# Patient Record
Sex: FEMALE | Race: WHITE | NOT HISPANIC OR LATINO | Employment: OTHER | URBAN - METROPOLITAN AREA
[De-identification: names, ages, dates, MRNs, and addresses within clinical notes are randomized per-mention and may not be internally consistent; named-entity substitution may affect disease eponyms.]

---

## 2024-04-22 ENCOUNTER — APPOINTMENT (OUTPATIENT)
Dept: RADIOLOGY | Facility: CLINIC | Age: 74
End: 2024-04-22
Payer: MEDICARE

## 2024-05-24 ENCOUNTER — HOSPITAL ENCOUNTER (OUTPATIENT)
Dept: RADIOLOGY | Facility: HOSPITAL | Age: 74
Discharge: HOME | End: 2024-05-24
Payer: MEDICARE

## 2024-05-24 DIAGNOSIS — I25.83 CORONARY ATHEROSCLEROSIS DUE TO LIPID RICH PLAQUE (CODE): ICD-10-CM

## 2024-05-24 DIAGNOSIS — I25.10 ATHEROSCLEROTIC HEART DISEASE OF NATIVE CORONARY ARTERY WITHOUT ANGINA PECTORIS: ICD-10-CM

## 2024-05-24 DIAGNOSIS — Z13.6 ENCOUNTER FOR SCREENING FOR CARDIOVASCULAR DISORDERS: ICD-10-CM

## 2024-05-24 PROCEDURE — 75571 CT HRT W/O DYE W/CA TEST: CPT

## 2025-01-05 ENCOUNTER — HOSPITAL ENCOUNTER (EMERGENCY)
Facility: HOSPITAL | Age: 75
Discharge: HOME | End: 2025-01-05
Payer: MEDICARE

## 2025-01-05 ENCOUNTER — APPOINTMENT (OUTPATIENT)
Dept: RADIOLOGY | Facility: HOSPITAL | Age: 75
End: 2025-01-05
Payer: MEDICARE

## 2025-01-05 VITALS
TEMPERATURE: 97.3 F | RESPIRATION RATE: 18 BRPM | WEIGHT: 106 LBS | SYSTOLIC BLOOD PRESSURE: 130 MMHG | OXYGEN SATURATION: 96 % | HEIGHT: 62 IN | HEART RATE: 60 BPM | DIASTOLIC BLOOD PRESSURE: 67 MMHG | BODY MASS INDEX: 19.51 KG/M2

## 2025-01-05 DIAGNOSIS — J44.1 COPD WITH ACUTE EXACERBATION (MULTI): Primary | ICD-10-CM

## 2025-01-05 LAB
FLUAV RNA RESP QL NAA+PROBE: NOT DETECTED
FLUBV RNA RESP QL NAA+PROBE: NOT DETECTED
SARS-COV-2 RNA RESP QL NAA+PROBE: NOT DETECTED

## 2025-01-05 PROCEDURE — 71046 X-RAY EXAM CHEST 2 VIEWS: CPT | Performed by: RADIOLOGY

## 2025-01-05 PROCEDURE — 71046 X-RAY EXAM CHEST 2 VIEWS: CPT

## 2025-01-05 PROCEDURE — 87636 SARSCOV2 & INF A&B AMP PRB: CPT | Performed by: PHYSICIAN ASSISTANT

## 2025-01-05 PROCEDURE — 2500000002 HC RX 250 W HCPCS SELF ADMINISTERED DRUGS (ALT 637 FOR MEDICARE OP, ALT 636 FOR OP/ED): Performed by: PHYSICIAN ASSISTANT

## 2025-01-05 PROCEDURE — 94640 AIRWAY INHALATION TREATMENT: CPT

## 2025-01-05 PROCEDURE — 99284 EMERGENCY DEPT VISIT MOD MDM: CPT | Mod: 25

## 2025-01-05 RX ORDER — IPRATROPIUM BROMIDE AND ALBUTEROL SULFATE 2.5; .5 MG/3ML; MG/3ML
3 SOLUTION RESPIRATORY (INHALATION) ONCE
Status: COMPLETED | OUTPATIENT
Start: 2025-01-05 | End: 2025-01-05

## 2025-01-05 RX ORDER — DOXYCYCLINE 100 MG/1
100 CAPSULE ORAL 2 TIMES DAILY
Qty: 14 CAPSULE | Refills: 0 | Status: SHIPPED | OUTPATIENT
Start: 2025-01-05 | End: 2025-01-12

## 2025-01-05 RX ADMIN — IPRATROPIUM BROMIDE AND ALBUTEROL SULFATE 3 ML: 2.5; .5 SOLUTION RESPIRATORY (INHALATION) at 10:50

## 2025-01-05 ASSESSMENT — LIFESTYLE VARIABLES
HAVE YOU EVER FELT YOU SHOULD CUT DOWN ON YOUR DRINKING: NO
EVER FELT BAD OR GUILTY ABOUT YOUR DRINKING: NO
EVER HAD A DRINK FIRST THING IN THE MORNING TO STEADY YOUR NERVES TO GET RID OF A HANGOVER: NO
HAVE PEOPLE ANNOYED YOU BY CRITICIZING YOUR DRINKING: NO
TOTAL SCORE: 0

## 2025-01-05 ASSESSMENT — COLUMBIA-SUICIDE SEVERITY RATING SCALE - C-SSRS
1. IN THE PAST MONTH, HAVE YOU WISHED YOU WERE DEAD OR WISHED YOU COULD GO TO SLEEP AND NOT WAKE UP?: NO
6. HAVE YOU EVER DONE ANYTHING, STARTED TO DO ANYTHING, OR PREPARED TO DO ANYTHING TO END YOUR LIFE?: NO
2. HAVE YOU ACTUALLY HAD ANY THOUGHTS OF KILLING YOURSELF?: NO

## 2025-01-05 ASSESSMENT — PAIN - FUNCTIONAL ASSESSMENT: PAIN_FUNCTIONAL_ASSESSMENT: 0-10

## 2025-01-05 ASSESSMENT — PAIN SCALES - GENERAL: PAINLEVEL_OUTOF10: 0 - NO PAIN

## 2025-01-05 NOTE — ED TRIAGE NOTES
Pt here with complaints of URI symptoms x 6 days. She started prescribed meds last Tuesday from her pcp and states she is not better.

## 2025-01-05 NOTE — ED PROVIDER NOTES
HPI   Chief Complaint   Patient presents with    Cough       History of present illness: 34-year-old female with history of COPD complains of cough for the past 6 days.  Cough is been productive of yellow sputum.  Patient has mild shortness of breath.  Has associated rhinorrhea and congestion.  Has been around other sick individuals recently.  Has some abdominal discomfort coughing.  No abdominal pain at baseline.  Denies chest pain back pain neck stiffness headache diplopia blurred vision paresthesias incontinence dysuria polyuria nausea vomiting.   He has an inhaler which has been helpful in bringing up her sputum.  She has not been using her nebulizer.  She does not take a normal daily medication for COPD.    Review of systems: Constitutional, eye, ENT, cardiovascular, respiratory, gastrointestinal, genitourinary, neurologic, musculoskeletal, dermatologic, hematologic, endocrine systems were evaluated and were negative unless otherwise specified in history of present illness.    Medications: Reviewed and per nursing note.    Family history: Denies relevant medical conditions.    Past medical history: COPD    Social history: Smokes cigarettes and denies alcohol, drug use.      Physical exam:    Appearance: Well-developed, well-nourished, nontoxic-appearing, alert and oriented x3. Talking in complete sentences.    HEENT:  Head normocephalic atraumatic, extraocular movements intact, pupils equal round reactive to light, mucous membranes are moist and pink.    NECK:  Nml Inspection, no meningismus, no thyromegaly, no lymphadenopathy, no JVD, trachea is midline.    Respiratory: Diminished breath sounds bilaterally.  No discrete adventitious sounds.  No respiratory distress.    Cardiovascular: Regular rate and rhythm, no murmurs, rubs or gallops. Pulses 2+ symmetrically in the dorsalis pedis and radial pulses.    Abdomen/GI:  Soft, nontender, nondistended, normal bowel sounds x4. No masses or organomegaly.    :  No  CVA tenderness    Neuro:  Oriented x 3, Speech Clear, cranial nerves grossly intact. Normal sensation to light touch in all 4 extremities.    Musculoskeletal: Patient spontaneously moves all 4 extremities.    Skin:  No cyanosis, clubbing, edema, open wounds, or rashes.  Diminished breath sounds bilaterally            Patient History   Past Medical History:   Diagnosis Date    COPD (chronic obstructive pulmonary disease) (Multi)     Emphysema lung (Multi)     Personal history of other diseases of the musculoskeletal system and connective tissue     Personal history of arthritis     History reviewed. No pertinent surgical history.  No family history on file.  Social History     Tobacco Use    Smoking status: Every Day     Types: Cigarettes    Smokeless tobacco: Never   Substance Use Topics    Alcohol use: Yes     Comment: ocassional    Drug use: Never       Physical Exam   ED Triage Vitals [01/05/25 1000]   Temperature Heart Rate Respirations BP   36.3 °C (97.3 °F) 60 18 130/67      Pulse Ox Temp Source Heart Rate Source Patient Position   96 % Temporal Monitor --      BP Location FiO2 (%)     -- --       Physical Exam      ED Course & MDM   Diagnoses as of 01/05/25 1218   COPD with acute exacerbation (Multi)                 No data recorded     [place] Coma Scale Score: 15 (01/05/25 0957 : Danya Landrum, RN)                           Medical Decision Making  Labs Reviewed  SARS-COV-2 AND INFLUENZA A/B PCR - Normal    XR chest 2 views   Final Result    No active disease in the chest identified.          MACRO:    None          Signed by: Dwight Andrade 1/5/2025 11:09 AM    Dictation workstation:   [id number]         Patient complains of cough and congestion.  Differential diagnosis of COVID-19, influenza, pneumonia, otitis media, tonsillitis, meningitis, sinusitis, COPD exacerbation, bronchitis.  Examination shows lungs clear to auscultation, normal tympanic membranes, no meningeal signs, no sinus tenderness making  pneumonia, otitis media, meningitis, sinusitis unlikely.      Chest x-ray influenza COVID-19 swabs ordered.    Swabs are negative.  Chest x-ray shows no infiltrates.  Patient had improvement of symptoms with nebulizer.  Presentation most consistent with COPD exacerbation.  Patient given prescription for doxycycline, to continue Medrol Dosepak, and to use inhaler and nebulizer as needed.    Patient will be discharged to home with prescription.  Patient is educated in signs and symptoms of worsening symptoms and reasons to come back to the emergency department.  Will need to follow up with primary care provider.  Patient does not report social determinants of health impacting ability to obtain care that is needed.  Patient agrees with plan.    This is a transcription.  Text was reviewed for errors, but some transcription errors may remain.  Please call for any questions.          Procedure  Procedures     Jersey Stevens PA-C  01/05/25 9317

## 2025-07-17 ENCOUNTER — HOSPITAL ENCOUNTER (EMERGENCY)
Facility: HOSPITAL | Age: 75
Discharge: HOME | End: 2025-07-17
Payer: MEDICARE

## 2025-07-17 ENCOUNTER — APPOINTMENT (OUTPATIENT)
Dept: RADIOLOGY | Facility: HOSPITAL | Age: 75
End: 2025-07-17
Payer: MEDICARE

## 2025-07-17 VITALS
WEIGHT: 100 LBS | DIASTOLIC BLOOD PRESSURE: 61 MMHG | RESPIRATION RATE: 20 BRPM | TEMPERATURE: 98.4 F | SYSTOLIC BLOOD PRESSURE: 122 MMHG | HEART RATE: 77 BPM | OXYGEN SATURATION: 99 % | HEIGHT: 61 IN | BODY MASS INDEX: 18.88 KG/M2

## 2025-07-17 DIAGNOSIS — M79.671 RIGHT FOOT PAIN: Primary | ICD-10-CM

## 2025-07-17 PROCEDURE — 73610 X-RAY EXAM OF ANKLE: CPT | Mod: RT

## 2025-07-17 PROCEDURE — 73630 X-RAY EXAM OF FOOT: CPT | Mod: RIGHT SIDE | Performed by: RADIOLOGY

## 2025-07-17 PROCEDURE — 99284 EMERGENCY DEPT VISIT MOD MDM: CPT

## 2025-07-17 PROCEDURE — 73610 X-RAY EXAM OF ANKLE: CPT | Mod: RIGHT SIDE | Performed by: STUDENT IN AN ORGANIZED HEALTH CARE EDUCATION/TRAINING PROGRAM

## 2025-07-17 PROCEDURE — 73630 X-RAY EXAM OF FOOT: CPT | Mod: RT

## 2025-07-17 RX ORDER — OXYCODONE AND ACETAMINOPHEN 5; 325 MG/1; MG/1
1 TABLET ORAL EVERY 6 HOURS PRN
Qty: 5 TABLET | Refills: 0 | Status: SHIPPED | OUTPATIENT
Start: 2025-07-17 | End: 2025-07-20

## 2025-07-17 RX ORDER — OXYCODONE AND ACETAMINOPHEN 5; 325 MG/1; MG/1
1 TABLET ORAL ONCE
Refills: 0 | Status: DISCONTINUED | OUTPATIENT
Start: 2025-07-17 | End: 2025-07-17 | Stop reason: HOSPADM

## 2025-07-17 ASSESSMENT — PAIN SCALES - GENERAL: PAINLEVEL_OUTOF10: 6

## 2025-07-17 ASSESSMENT — PAIN - FUNCTIONAL ASSESSMENT: PAIN_FUNCTIONAL_ASSESSMENT: 0-10

## 2025-07-17 NOTE — ED PROVIDER NOTES
EMERGENCY MEDICINE EVALUATION NOTE    History of Present Illness     Chief Complaint:   Chief Complaint   Patient presents with    right ankle pain       HPI: Ember Whitmore is a 74 y.o. female presents with a chief complaint of right ankle pain.  Patient initially told triage note she did not think she injured it however when I was talking to her she states that she believes she did it while she was on the porch yesterday.  She reports that she was getting up out of her chair and she felt like her foot stuck.  She awkwardly twisted the right lower extremity.  Patient reports that since then she has had swelling and pain along the lateral aspect of the right ankle and foot.  She has been able to put weight on it however it is painful for her.  Patient denies any loss of neurovascular status right lower extremity.      Previous History   Medical History[1]  Surgical History[2]  Social History[3]  Family History[4]  Allergies[5]  Current Outpatient Medications   Medication Instructions    oxyCODONE-acetaminophen (Percocet) 5-325 mg tablet 1 tablet, oral, Every 6 hours PRN       Physical Exam     Appearance: Alert, oriented , cooperative     Skin: Intact,  dry skin, no lesions, rash, petechiae or purpura.      Eyes: PERRLA, EOMs intact,  Conjunctiva pink      ENT: Hearing grossly intact. Pharynx clear, uvula midline.      Neck: Supple. Trachea at midline.      Pulmonary: Clear bilaterally. No rales, rhonchi or wheezing. No accessory muscle use or stridor.     Cardiac: Normal rate and rhythm without murmur     Abdomen: Soft, nontender, active bowel sounds.     Musculoskeletal: Full range of motion.  Patient is able to plantarflex dorsiflex right foot.  Patient does have diffuse tenderness along the lateral right foot as well as the lateral malleolus.  There is some swelling and bruising noted along the lateral right foot.     Neurological:Cranial nerves II through XII are grossly intact, normal sensation, no  "weakness, no focal findings identified.     Results   Labs Reviewed - No data to display  XR ankle right 3+ views   Final Result   Right ankle soft tissue swelling without fracture.   Signed by Ankit Hubbard      XR foot right 3+ views   Final Result   No definite acute osseous abnormality identified.  Postoperative   changes suggesting prior bunionectomy..   Suspected prior healed trauma base of the fifth metatarsal.   Signed by Robert Esposito            ED Course & Medical Decision Making     Medications   oxyCODONE-acetaminophen (Percocet) 5-325 mg per tablet 1 tablet (1 tablet oral Not Given 7/17/25 1510)     Heart Rate:  [77]   Temperature:  [36.9 °C (98.4 °F)]   Respirations:  [20]   BP: (122)/(61)   Height:  [154.9 cm (5' 1\")]   Weight:  [45.4 kg (100 lb)]   Pulse Ox:  [99 %]    ED Course as of 07/17/25 1748   Thu Jul 17, 2025 1745 Updated the patient on the results of the x-rays.  Did not appear to be any acute fracture but there could have been a remote fracture of the right fifth metatarsal, but there does not appear to be any acute fractures.  We discussed plan of care going forward.  Patient will be placed in a walking boot at this time.  She will have pain medication sent to her pharmacy.  She states that she has an orthopedic surgeon she can see as her  sees 1.  She declined referral to 1 at this time.  Patient encouraged to follow-up with them as an outpatient and take pain medication as prescribed.  She was encouraged to return here immediately with any worsening symptoms. [CJ]      ED Course User Index  [CJ] Juan Antonio Martínez PA-C         Diagnoses as of 07/17/25 1748   Right foot pain       Procedures   Splint Application    Performed by: Juan Antonio Martínez PA-C  Authorized by: Juan Antonio Martínez PA-C    Consent:     Consent obtained:  Verbal    Consent given by:  Patient    Risks, benefits, and alternatives were discussed: yes      Risks discussed:  Discoloration, numbness, pain and swelling    " Alternatives discussed:  No treatment and delayed treatment  Universal protocol:     Patient identity confirmed:  Verbally with patient  Pre-procedure details:     Distal neurologic exam:  Normal    Distal perfusion: distal pulses strong and brisk capillary refill    Procedure details:     Location:  Foot    Foot location:  R foot    Supplies:  Boot    Supervision: I personally supervised and inspected the splint/strap which was applied. The extremity is appropriately immobilized. Patient neurovascularly intact before and after the splint application.    Post-procedure details:     Distal neurologic exam:  Normal    Distal perfusion: brisk capillary refill      Procedure completion:  Tolerated well, no immediate complications    Post-procedure imaging: not applicable        Diagnosis     1. Right foot pain        Disposition   Discharged     ED Prescriptions       Medication Sig Dispense Start Date End Date Auth. Provider    oxyCODONE-acetaminophen (Percocet) 5-325 mg tablet Take 1 tablet by mouth every 6 hours if needed for severe pain (7 - 10) for up to 3 days. 5 tablet 7/17/2025 7/20/2025 Juan Antonio Martínez PA-C            Disclaimer: This note was dictated by speech recognition. Minor errors in transcription may be present. Please call if questions.         [1]   Past Medical History:  Diagnosis Date    COPD (chronic obstructive pulmonary disease) (Multi)     Emphysema lung (Multi)     Personal history of other diseases of the musculoskeletal system and connective tissue     Personal history of arthritis   [2] History reviewed. No pertinent surgical history.  [3]   Social History  Tobacco Use    Smoking status: Every Day     Types: Cigarettes    Smokeless tobacco: Never   Substance Use Topics    Alcohol use: Yes     Comment: ocassional    Drug use: Never   [4] No family history on file.  [5]   Allergies  Allergen Reactions    Meperidine GI Upset    Sulfa (Sulfonamide Antibiotics) Other    Ciprofloxacin Rash      Either cipro or bactrim    Was unsure if it was cipro or bactrim        Juan Antonio Martínez PA-C  07/17/25 0762
